# Patient Record
Sex: FEMALE | Race: WHITE | NOT HISPANIC OR LATINO | URBAN - METROPOLITAN AREA
[De-identification: names, ages, dates, MRNs, and addresses within clinical notes are randomized per-mention and may not be internally consistent; named-entity substitution may affect disease eponyms.]

---

## 2020-07-29 ENCOUNTER — TELEPHONE ENCOUNTER (OUTPATIENT)
Dept: URBAN - METROPOLITAN AREA CLINIC 68 | Facility: CLINIC | Age: 55
End: 2020-07-29

## 2020-08-18 ENCOUNTER — OFFICE VISIT (OUTPATIENT)
Dept: URBAN - METROPOLITAN AREA CLINIC 68 | Facility: CLINIC | Age: 55
End: 2020-08-18

## 2020-08-24 ENCOUNTER — OFFICE VISIT (OUTPATIENT)
Dept: URBAN - METROPOLITAN AREA CLINIC 66 | Facility: CLINIC | Age: 55
End: 2020-08-24

## 2022-03-18 PROBLEM — Z91.09 ENVIRONMENTAL ALLERGIES: Status: ACTIVE | Noted: 2022-02-01

## 2022-03-18 PROBLEM — M85.80 OSTEOPENIA: Status: ACTIVE | Noted: 2022-02-01

## 2022-03-18 PROBLEM — Z78.0 POST-MENOPAUSAL: Status: ACTIVE | Noted: 2022-02-01

## 2022-03-19 PROBLEM — K52.9 IBD (INFLAMMATORY BOWEL DISEASE): Status: ACTIVE | Noted: 2022-02-01

## 2022-03-20 PROBLEM — B00.9 HERPES SIMPLEX TYPE 1 INFECTION: Status: ACTIVE | Noted: 2022-02-01

## 2022-03-20 PROBLEM — N60.12 FIBROCYSTIC DISEASE OF LEFT BREAST: Status: ACTIVE | Noted: 2022-02-01

## 2022-05-23 DIAGNOSIS — Z78.0 POST-MENOPAUSAL: Primary | ICD-10-CM

## 2022-05-23 DIAGNOSIS — Z91.09 ENVIRONMENTAL ALLERGIES: ICD-10-CM

## 2022-05-23 RX ORDER — MONTELUKAST SODIUM 10 MG/1
10 TABLET ORAL DAILY
Qty: 30 TABLET | Refills: 1 | Status: CANCELLED | OUTPATIENT
Start: 2022-05-23

## 2022-05-23 RX ORDER — PROGESTERONE 100 MG/1
100 CAPSULE ORAL DAILY
Qty: 20 CAPSULE | Refills: 1 | Status: CANCELLED | OUTPATIENT
Start: 2022-05-23

## 2022-05-23 RX ORDER — PROGESTERONE 100 MG/1
100 CAPSULE ORAL DAILY
Qty: 20 CAPSULE | Refills: 1 | Status: SHIPPED | OUTPATIENT
Start: 2022-05-23

## 2022-05-23 RX ORDER — ESTRADIOL 1 MG/1
1 TABLET ORAL DAILY
Qty: 21 TABLET | Refills: 1 | Status: CANCELLED | OUTPATIENT
Start: 2022-05-23

## 2022-05-23 RX ORDER — ESTRADIOL 1 MG/1
1 TABLET ORAL DAILY
Qty: 21 TABLET | Refills: 1 | Status: SHIPPED | OUTPATIENT
Start: 2022-05-23

## 2022-05-23 RX ORDER — MONTELUKAST SODIUM 10 MG/1
10 TABLET ORAL DAILY
Qty: 30 TABLET | Refills: 1 | Status: SHIPPED | OUTPATIENT
Start: 2022-05-23

## 2022-05-23 NOTE — TELEPHONE ENCOUNTER
Pt needs a refill of her estradiol (ESTRACE) 1 MG tablet, montelukast (SINGULAIR) 10 MG tablet and progesterone (PROMETRIUM) 100 MG CAPS capsule medications.

## 2022-06-04 ENCOUNTER — TELEPHONE ENCOUNTER (OUTPATIENT)
Dept: URBAN - METROPOLITAN AREA CLINIC 68 | Facility: CLINIC | Age: 57
End: 2022-06-04

## 2022-06-04 RX ORDER — RIFAXIMIN 550 MG/1
TABLET ORAL
Qty: 42 | Refills: 0 | OUTPATIENT
Start: 2019-12-23 | End: 2020-01-06

## 2022-06-04 RX ORDER — BUDESONIDE 9 MG/1
TABLET, EXTENDED RELEASE ORAL DAILY
Qty: 56 | Refills: 0 | OUTPATIENT
Start: 2019-11-25 | End: 2020-01-20

## 2022-06-04 RX ORDER — SODIUM SULFATE, POTASSIUM SULFATE, MAGNESIUM SULFATE 17.5; 3.13; 1.6 G/ML; G/ML; G/ML
SOLUTION, CONCENTRATE ORAL AS DIRECTED
Qty: 1 | Refills: 0 | OUTPATIENT
Start: 2019-08-12 | End: 2019-08-13

## 2022-06-04 RX ORDER — BUDESONIDE 3 MG/1
CAPSULE, COATED PELLETS ORAL DAILY
Qty: 270 | Refills: 0 | OUTPATIENT
Start: 2020-04-23 | End: 2020-07-22

## 2022-06-05 ENCOUNTER — TELEPHONE ENCOUNTER (OUTPATIENT)
Dept: URBAN - METROPOLITAN AREA CLINIC 68 | Facility: CLINIC | Age: 57
End: 2022-06-05

## 2022-06-05 RX ORDER — NAPROXEN SODIUM 220 MG
PHILLIPS COLON HEALTH(  ORAL  DAILY ) ACTIVE -HX ENTRY TABLET ORAL DAILY
Status: ACTIVE | COMMUNITY
Start: 2020-08-24

## 2022-06-05 RX ORDER — THIAMINE HCL 100 MG
MAGNESIUM( 500MG ORAL  DAILY ) ACTIVE -HX ENTRY TABLET ORAL DAILY
Status: ACTIVE | COMMUNITY
Start: 2020-08-24

## 2022-06-05 RX ORDER — ESTRADIOL 1 MG/1
ESTRADIOL( 1MG ORAL  DAILY ) ACTIVE -HX ENTRY TABLET ORAL DAILY
Status: ACTIVE | COMMUNITY
Start: 2020-08-24

## 2022-06-05 RX ORDER — TURMERIC ROOT EXTRACT 500 MG
TURMERIC CURCUMIN( 500MG ORAL  BID ) ACTIVE -HX ENTRY CAPSULE ORAL BID
Status: ACTIVE | COMMUNITY
Start: 2020-08-24

## 2022-06-25 ENCOUNTER — TELEPHONE ENCOUNTER (OUTPATIENT)
Age: 57
End: 2022-06-25

## 2022-06-25 RX ORDER — SODIUM SULFATE, POTASSIUM SULFATE, MAGNESIUM SULFATE 17.5; 3.13; 1.6 G/ML; G/ML; G/ML
SOLUTION, CONCENTRATE ORAL AS DIRECTED
Qty: 1 | Refills: 0 | OUTPATIENT
Start: 2019-08-12 | End: 2019-08-13

## 2022-06-25 RX ORDER — BUDESONIDE 9 MG/1
TABLET, EXTENDED RELEASE ORAL DAILY
Qty: 56 | Refills: 0 | OUTPATIENT
Start: 2019-11-25 | End: 2020-01-20

## 2022-06-25 RX ORDER — RIFAXIMIN 550 MG/1
TABLET ORAL
Qty: 42 | Refills: 0 | OUTPATIENT
Start: 2019-12-23 | End: 2020-01-06

## 2022-06-25 RX ORDER — BUDESONIDE 3 MG/1
CAPSULE, COATED PELLETS ORAL DAILY
Qty: 270 | Refills: 0 | OUTPATIENT
Start: 2020-04-23 | End: 2020-07-22

## 2022-06-26 ENCOUNTER — TELEPHONE ENCOUNTER (OUTPATIENT)
Age: 57
End: 2022-06-26

## 2022-06-26 RX ORDER — ESTRADIOL 1 MG/1
ESTRADIOL( 1MG ORAL  DAILY ) ACTIVE -HX ENTRY TABLET ORAL DAILY
Status: ACTIVE | COMMUNITY
Start: 2020-08-24

## 2022-06-26 RX ORDER — PROGESTERONE 100 MG/1
PROGESTERONE( 100MG ORAL  DAILY ) ACTIVE -HX ENTRY CAPSULE ORAL DAILY
Status: ACTIVE | COMMUNITY
Start: 2020-08-24

## 2022-06-26 RX ORDER — TURMERIC ROOT EXTRACT 500 MG
TURMERIC CURCUMIN( 500MG ORAL  BID ) ACTIVE -HX ENTRY CAPSULE ORAL BID
Status: ACTIVE | COMMUNITY
Start: 2020-08-24

## 2022-06-26 RX ORDER — NAPROXEN SODIUM 220 MG
PHILLIPS COLON HEALTH(  ORAL  DAILY ) ACTIVE -HX ENTRY TABLET ORAL DAILY
Status: ACTIVE | COMMUNITY
Start: 2020-08-24

## 2022-07-09 ENCOUNTER — TELEPHONE ENCOUNTER (OUTPATIENT)
Dept: URBAN - METROPOLITAN AREA CLINIC 121 | Facility: CLINIC | Age: 57
End: 2022-07-09

## 2022-07-09 RX ORDER — ESTROGENS, CONJUGATED 0.3 MG/1
TABLET, FILM COATED ORAL TAKE AS DIRECTED
Refills: 0 | OUTPATIENT
Start: 2016-05-09 | End: 2016-06-20

## 2022-07-09 RX ORDER — MESALAMINE 1.2 G/1
TAKE 2 TABLETS PO ONCE A DAY TABLET, DELAYED RELEASE ORAL
Refills: 0 | OUTPATIENT
Start: 2016-05-09 | End: 2016-06-20

## 2022-07-10 ENCOUNTER — TELEPHONE ENCOUNTER (OUTPATIENT)
Dept: URBAN - METROPOLITAN AREA CLINIC 121 | Facility: CLINIC | Age: 57
End: 2022-07-10

## 2022-07-10 RX ORDER — ESTROGENS, CONJUGATED 0.3 MG/1
TABLET, FILM COATED ORAL TAKE AS DIRECTED
Refills: 0 | Status: ACTIVE | COMMUNITY
Start: 2016-06-20

## 2022-07-10 RX ORDER — PREDNISONE 5 MG/1
TAKE 4 TABS PO QDAY FOR 1 WEEK, THEN 3 TABS PO QDAY FOR 1 WEEK, THEN 2 TABS PO QDAY FOR 1 WEEK, THEN ONE TAB PO QDAY FOR 1 WEEK THEN STOP TABLET ORAL
Refills: 0 | Status: ACTIVE | COMMUNITY
Start: 2016-05-31

## 2022-07-10 RX ORDER — BALSALAZIDE DISODIUM 750 MG/1
TAKE 2 TABLETS PO THREE TIMES A DAY CAPSULE ORAL
Refills: 3 | Status: ACTIVE | COMMUNITY
Start: 2016-06-20

## 2023-01-12 ENCOUNTER — TELEPHONE (OUTPATIENT)
Dept: INTERNAL MEDICINE CLINIC | Facility: CLINIC | Age: 58
End: 2023-01-12

## 2023-01-12 NOTE — TELEPHONE ENCOUNTER
Amy from [facility] called on behalf of patient requesting  a refill  estradiol (ESTRACE) 1 MG tablet  Send to  [facility] [id number] [place] [address]

## 2024-08-22 ENCOUNTER — OFFICE VISIT (OUTPATIENT)
Dept: URBAN - METROPOLITAN AREA CLINIC 68 | Facility: CLINIC | Age: 59
End: 2024-08-22